# Patient Record
Sex: MALE | Race: WHITE
[De-identification: names, ages, dates, MRNs, and addresses within clinical notes are randomized per-mention and may not be internally consistent; named-entity substitution may affect disease eponyms.]

---

## 2018-02-28 ENCOUNTER — HOSPITAL ENCOUNTER (OUTPATIENT)
Dept: HOSPITAL 92 - ULT | Age: 73
Discharge: HOME | End: 2018-02-28
Attending: INTERNAL MEDICINE
Payer: MEDICARE

## 2018-02-28 DIAGNOSIS — N18.6: ICD-10-CM

## 2018-02-28 DIAGNOSIS — Z01.818: Primary | ICD-10-CM

## 2018-02-28 PROCEDURE — 93970 EXTREMITY STUDY: CPT

## 2018-02-28 PROCEDURE — G0365 VESSEL MAPPING HEMO ACCESS: HCPCS

## 2018-02-28 NOTE — ULT
BILATERAL UPPER EXTREMITY VENOUS MAPPIN18

 

HISTORY: 

End-stage renal disease. Evaluate for placement of arteriovenous dialysis fistula. 

 

FINDINGS:  

There is normal flow seen within the bilateral internal jugular and subclavian veins with normal lume
n compressibility involving each axillary vein. 

 

RIGHT UPPER EXTREMITY

 

BRACHIAL ARTERY:     5.6 mm  

RADIAL ARTERY:       3.1 mm

ULNAR ARTERY:        2.3 mm

 

CEPHALIC VEIN

Upper Arm:           1.3 mm

Mid Arm:             0.9 mm

Distal Arm:          1.7 mm

Antecubital Fossa:   1.5 mm

Proximal Forearm:    2 mm

Mid Forearm:         3.3 mm

Distal Forearm:      3.2 mm

 

BASILIC VEIN

Upper Arm:           8.5 mm

Mid Arm:             5.3 mm

Distal Arm:          5.1 mm

Antecubital Fossa:   4.8 mm

Proximal Forearm:    1.9 mm

Mid Forearm:         1.3 mm

Distal Forearm:      1.8 mm

 

 

LEFT UPPER EXTREMITY 

 

BRACHIAL ARTERY:    5.2 mm

RADIAL ARTERY:      3.2 mm

ULNAR ARTERY:       3 mm

 

CEPHALIC VEIN

Upper Arm:          1.7 mm

Mid Arm:            1.3 mm

Distal Arm:         1.2 mm

Antecubital Fossa:  1.9 mm

Proximal Forearm:   2.7 mm

Mid Forearm:        2.7 mm

Distal Forearm:     3 mm

 

BASILIC VEIN

Upper Arm:          5.2 mm

Mid Arm:            4.8 mm

Distal Arm:         4.3 mm

Antecubital Fossa:  2.9 mm

Proximal Forearm:   8.1 mm

Mid Forearm:        1.4 mm

Distal Forearm:     1.9 mm

 

IMPRESSION:

The venous diameters of the bilateral upper extremity basilic and cephalic veins are as described abo
nadege.

 

POS: [facility]

## 2018-04-02 ENCOUNTER — HOSPITAL ENCOUNTER (OUTPATIENT)
Dept: HOSPITAL 92 - LABBT | Age: 73
Discharge: HOME | End: 2018-04-02
Attending: SURGERY
Payer: MEDICARE

## 2018-04-02 DIAGNOSIS — I12.0: ICD-10-CM

## 2018-04-02 DIAGNOSIS — Z01.818: Primary | ICD-10-CM

## 2018-04-02 DIAGNOSIS — N18.6: ICD-10-CM

## 2018-04-02 LAB
ANION GAP SERPL CALC-SCNC: 18 MMOL/L (ref 10–20)
BASOPHILS # BLD AUTO: 0.1 THOU/UL (ref 0–0.2)
BASOPHILS NFR BLD AUTO: 1.1 % (ref 0–1)
BUN SERPL-MCNC: 73 MG/DL (ref 8.4–25.7)
CALCIUM SERPL-MCNC: 9.4 MG/DL (ref 7.8–10.44)
CHLORIDE SERPL-SCNC: 100 MMOL/L (ref 98–107)
CO2 SERPL-SCNC: 22 MMOL/L (ref 23–31)
CREAT CL PREDICTED SERPL C-G-VRATE: 0 ML/MIN (ref 70–130)
EOSINOPHIL # BLD AUTO: 0.7 THOU/UL (ref 0–0.7)
EOSINOPHIL NFR BLD AUTO: 7.5 % (ref 0–10)
GLUCOSE SERPL-MCNC: 107 MG/DL (ref 83–110)
HGB BLD-MCNC: 11.6 G/DL (ref 14–18)
LYMPHOCYTES # BLD: 1.5 THOU/UL (ref 1.2–3.4)
LYMPHOCYTES NFR BLD AUTO: 17.1 % (ref 21–51)
MCH RBC QN AUTO: 29.8 PG (ref 27–31)
MCV RBC AUTO: 91.3 FL (ref 80–94)
MONOCYTES # BLD AUTO: 0.8 THOU/UL (ref 0.11–0.59)
MONOCYTES NFR BLD AUTO: 9.2 % (ref 0–10)
NEUTROPHILS # BLD AUTO: 5.9 THOU/UL (ref 1.4–6.5)
NEUTROPHILS NFR BLD AUTO: 65.1 % (ref 42–75)
PLATELET # BLD AUTO: 239 THOU/UL (ref 130–400)
POTASSIUM SERPL-SCNC: 4.9 MMOL/L (ref 3.5–5.1)
RBC # BLD AUTO: 3.89 MILL/UL (ref 4.7–6.1)
SODIUM SERPL-SCNC: 135 MMOL/L (ref 136–145)
WBC # BLD AUTO: 9 THOU/UL (ref 4.8–10.8)

## 2018-04-02 PROCEDURE — 93010 ELECTROCARDIOGRAM REPORT: CPT

## 2018-04-02 PROCEDURE — 80048 BASIC METABOLIC PNL TOTAL CA: CPT

## 2018-04-02 PROCEDURE — 93005 ELECTROCARDIOGRAM TRACING: CPT

## 2018-04-02 PROCEDURE — 85025 COMPLETE CBC W/AUTO DIFF WBC: CPT

## 2018-04-02 NOTE — HP
HISTORY OF PRESENT ILLNESS:  Mr. Lobo Self is a 72-year-old male patient, retired contractor, 
lives in [place], [place].  Dialyzes, [facility] [place] Tuesday, Thursday, and Saturday, followed by Dr. Se de los santos.  Patient had a dialysis catheter placed at [facility] 01/03/2018.  He has been di
alyzing Tuesday, Thursday, and Saturday at [facility].  He presents for more definitive dialysis 
access.  Ultrasound vein mapping in both arms at [facility] on 02/28/2018 reveals cephalic v
ein, right, 1.3, 0.9, 1.7 mm.  At the antecubital fossa, 1.5 mm, distal forearm 2 mm, 3.3 and 3.2 mm,
  basilic vein upper arm 8.5, 5.3, 5.1, 4.8 mm.  Left arm cephalic vein 1.7, 1.3, 1.2, 1.9, 2.7, 2.7,
 3 mm, basilic vein 5.2, 4.8, 4.3 mm, 2.9 mm antecubital fossa, 8.1, 1.4, 1.9 mm distal forearm.  Xi
n is for a right arm fistula, possible Umair at the wrist or possible more proximal fistula, possibl
y, he may need a basilic vein transposition fistula has been discussed.  Patient's home health has be
en visited by the peritoneal dialysis nurse.  He has PAD.  He does not consider that, but is a future
 consideration.

 

PAST MEDICAL HISTORY:  BPH, coronary artery disease, status post myocardial infarction 2017 with 2 co
ronary stents placed.  Right kidney disease, stroke in the past 20 years ago, living with a left mirna
paresis.  He still has pain from this, although moderate, has recovered, diabetes mellitus, insulin-d
ependent type 2, hypertension, hyperlipidemia, history of renal cell carcinoma.

 

ALLERGIES:  None.

 

PAST SURGICAL HISTORY:  Left nephrectomy for renal cell carcinoma, coronary stents placed.  He has ha
d a colonoscopy many years ago.

 

REVIEW OF SYSTEMS:  Otherwise, noncontributory.

 

PHYSICAL EXAMINATION:

VITAL SIGNS:  177 pounds, 110/61, 76, 99.3 degrees.

HEENT:  Unremarkable.

LUNGS:  Clear to auscultation.

CARDIAC:  Regular rate and rhythm without murmur or gallop.

ABDOMEN:  Soft, nontender, no masses.  Supraumbilical scar for nephrectomy extraction point.

EXTREMITIES:  Unremarkable.  Palpable radial pulses noted to possible vein seen.

 

ASSESSMENT AND PLAN:  End-stage renal disease - hemodialysis catheter dependent since 01/03/2018 when
 it was placed at [facility].  He dialyzes at [facility] in [place], Tuesday, Thursd
ay, Saturday.  He lives in [place].  Plan is to place a right arm primary fistula, Umair versus more 
proximal.  He understands the possibility, he may need a staged basilic vein transposition versus pro
sthetic graft.  He understands risks of infection, bleeding, reoperation, malfunction of the fistula 
and consents.

## 2018-04-06 ENCOUNTER — HOSPITAL ENCOUNTER (OUTPATIENT)
Dept: HOSPITAL 92 - SDC | Age: 73
Discharge: HOME | End: 2018-04-06
Attending: SURGERY
Payer: MEDICARE

## 2018-04-06 VITALS — BODY MASS INDEX: 26.3 KG/M2

## 2018-04-06 DIAGNOSIS — I25.10: ICD-10-CM

## 2018-04-06 DIAGNOSIS — Z99.2: ICD-10-CM

## 2018-04-06 DIAGNOSIS — I25.2: ICD-10-CM

## 2018-04-06 DIAGNOSIS — N40.0: ICD-10-CM

## 2018-04-06 DIAGNOSIS — Z85.528: ICD-10-CM

## 2018-04-06 DIAGNOSIS — Z98.890: ICD-10-CM

## 2018-04-06 DIAGNOSIS — I12.0: Primary | ICD-10-CM

## 2018-04-06 DIAGNOSIS — E78.5: ICD-10-CM

## 2018-04-06 DIAGNOSIS — N18.6: ICD-10-CM

## 2018-04-06 DIAGNOSIS — E11.22: ICD-10-CM

## 2018-04-06 DIAGNOSIS — Z95.5: ICD-10-CM

## 2018-04-06 DIAGNOSIS — Z90.5: ICD-10-CM

## 2018-04-06 PROCEDURE — 96374 THER/PROPH/DIAG INJ IV PUSH: CPT

## 2018-04-06 PROCEDURE — 031B09F BYPASS RIGHT RADIAL ARTERY TO LOWER ARM VEIN WITH AUTOLOGOUS VENOUS TISSUE, OPEN APPROACH: ICD-10-PCS | Performed by: SURGERY

## 2018-04-06 NOTE — OP
DATE OF PROCEDURE:  04/06/2018

 

PREOPERATIVE DIAGNOSES:  End-stage renal disease, poor veins by ultrasound vein mapping left arm, sup
erior right arm suggesting basilic vein possibility fistula.

 

POSTOPERATIVE DIAGNOSES:  End-stage renal disease, poor veins by ultrasound vein mapping left arm, matias
perior right arm suggesting basilic vein possibility fistula.

 

PROCEDURES PERFORMED:  Right arm primary AV fistula, perforating branch antecubital vein to proximal 
radial artery, which was of the excellent size.  Outflow basilic vein only without communication to t
he cephalic vein.  Retrograde antecubital vein was large and patency maintained.  Calibration to a 4 
mm dilator, outflow basilic vein.  Exploration of right wrist noting cephalic vein at the wrist to be
 too small.

 

ANESTHESIA:  Regional anesthesia.

 

ESTIMATED BLOOD LOSS:  27 mL.

 

BLOOD TRANSFUSED:  None.

 

PROCEDURE IN DETAIL:  The patient was taken to the operating room under regional anesthesia and intra
venous sedation, right upper extremity was prepped with ChloraPrep, draped in routine fashion.  An in
cision was made in the proximal volar forearm longitudinally below the antecubital fossa, carried bret
n through the skin and subcutaneous tissue and a very large antecubital vein appreciated.  It was ender
reciated that there was no communication of the cephalic vein.  Cephalic vein was searched for latera
lly and could not be found.  Basilic vein went to the medial arm as expected.  A perforating branch a
ntecubital vein dissected free and directed towards a large radial artery.  Radial artery was dissect
ed free and surrounded with Silastic loop.  The patient was given 6000 units of heparin intravenously
.  Considering the large antecubital vein, incision was made in the right wrist carried down through 
the skin and subcutaneous tissue.  The cephalic vein at the wrist was inadequate and this wound was c
losed by approximating subcutaneous tissues with 3-0 Monocryl, skin with subdermal 4-0 Monocryl.  Att
ention then was turned to the proximal forearm.  A perforating branch and antecubital vein dissected 
free.  Branches divided between 4-0 silk ties and clips and spatulated over a branch point and interr
ogated with coronary dilators, passing coronary dilators from a 2 mm to a 4 mm coronary dilator out t
he basilic vein outflow.  There was a large antecubital vein, retrograde preserved.  Proximal radial 
artery was clamped proximally and distally and longitudinal arteriotomy made sharply and elongated wi
th the Rosales scissors for a 2 cm anastomosis and end perforating branch of the antecubital vein to th
e side proximal radial artery anastomosis with continuous suture of 6-0 Prolene, releasing vascular c
lamps, gaining hemostasis with 6-0 Prolene and 4-0 silk ties, noting good flow Doppler signal at the 
basilic vein.  Good hemostasis noted.  The patient was given 25 mg of protamine intravenously by Gerson dorantes.  Subcutaneous tissues approximated with 3-0 Monocryl, skin with subdermal 4-0 Monocryl and De
rmaGlue applied.

## 2018-05-03 NOTE — HP
HISTORY OF PRESENT ILLNESS:  Lobo Self is a 72-year-old male patient who dialyzes at [facility] on Tuesday, Thursday and Saturday.  He is followed by Dr. Thomas.  He underwent a right arm dialy
sis fistula 04/06/2018, and he was noted at that time during the operation that he would need a basil
ic vein transposition fistula.  He was seen today and his fistula is working well, has good thrill an
d bruit.  His wound has a small hematoma.  Plan is for basilic vein transposition fistula in the next
 two weeks as an outpatient under regional anesthesia.  He understands the risks and benefits of the 
procedure and consents.  The patient is a retired contractor, lives in [place], [place] and is followed 
by Dr. Severson, Nephrology.  He initially had a dialysis catheter placed at [facility] 0
1/03/2018.  He dialyzes Tuesday, Thursday and Saturday at [facility]

 

PAST MEDICAL HISTORY:  BPH, coronary artery disease, status post myocardial infarction in 2017 with 2
 coronary artery stents, right kidney with chronic kidney disease, stroke in the past 20 years ago wi
th residual left hemiparesis.  He still has pain from this, although moderate and has mostly recovere
d and is mobile independently.  Diabetes mellitus, insulin-dependent type 2, hypertension, hyperlipid
emia, history of renal cell carcinoma and supposedly has a metastatic focus in his lung that they hav
e been following.  He is up to date on his colonoscopies.

 

PAST SURGICAL HISTORY:  Left nephrectomy for renal cell carcinoma, coronary stents placed, colonoscop
y many years ago.

 

REVIEW OF SYSTEMS:  Noncontributory.

 

PHYSICAL EXAMINATION:

VITAL SIGNS:  123/62, 85, 99.7 degrees.

HEENT:  Unremarkable.

LUNGS:  Clear to auscultation.

CARDIAC:  Regular rate and rhythm without murmur or gallop.

ABDOMEN:  Soft, nontender.

EXTREMITIES:  He has a good functioning right hand.  He has well-healed surgical wound in proximal vo
lar forearm below the antecubital fossa.  He has a small hematoma.  There is no infection.  He has a 
good thrill and bruit.  He has outflow of the fistula.

 

ASSESSMENT AND PLAN:  We will plan basilic vein transposition fistula, right arm to allow functioning
 fistula.  Risk of infection, bleeding, reoperation, nerve injury, surgery explained and he consents.
  We will plan this as an outpatient under regional anesthesia.  IV access blood draws through his di
alysis catheter.

## 2018-05-09 ENCOUNTER — HOSPITAL ENCOUNTER (OUTPATIENT)
Dept: HOSPITAL 92 - SDC | Age: 73
Discharge: HOME | End: 2018-05-09
Attending: SURGERY
Payer: MEDICARE

## 2018-05-09 VITALS — BODY MASS INDEX: 25.8 KG/M2

## 2018-05-09 DIAGNOSIS — I25.2: ICD-10-CM

## 2018-05-09 DIAGNOSIS — I69.354: ICD-10-CM

## 2018-05-09 DIAGNOSIS — E11.22: ICD-10-CM

## 2018-05-09 DIAGNOSIS — Z79.82: ICD-10-CM

## 2018-05-09 DIAGNOSIS — Z79.899: ICD-10-CM

## 2018-05-09 DIAGNOSIS — Z85.528: ICD-10-CM

## 2018-05-09 DIAGNOSIS — N40.0: ICD-10-CM

## 2018-05-09 DIAGNOSIS — I25.10: ICD-10-CM

## 2018-05-09 DIAGNOSIS — Z79.02: ICD-10-CM

## 2018-05-09 DIAGNOSIS — Z95.5: ICD-10-CM

## 2018-05-09 DIAGNOSIS — Z79.4: ICD-10-CM

## 2018-05-09 DIAGNOSIS — I12.0: Primary | ICD-10-CM

## 2018-05-09 DIAGNOSIS — Z99.2: ICD-10-CM

## 2018-05-09 DIAGNOSIS — N18.6: ICD-10-CM

## 2018-05-09 LAB
ANION GAP SERPL CALC-SCNC: 13 MMOL/L (ref 10–20)
BASOPHILS # BLD AUTO: 0.1 THOU/UL (ref 0–0.2)
BASOPHILS NFR BLD AUTO: 0.7 % (ref 0–1)
BUN SERPL-MCNC: 33 MG/DL (ref 8.4–25.7)
CALCIUM SERPL-MCNC: 9.1 MG/DL (ref 7.8–10.44)
CHLORIDE SERPL-SCNC: 99 MMOL/L (ref 98–107)
CO2 SERPL-SCNC: 26 MMOL/L (ref 23–31)
CREAT CL PREDICTED SERPL C-G-VRATE: 17 ML/MIN (ref 70–130)
EOSINOPHIL # BLD AUTO: 0.6 THOU/UL (ref 0–0.7)
EOSINOPHIL NFR BLD AUTO: 5.6 % (ref 0–10)
GLUCOSE SERPL-MCNC: 122 MG/DL (ref 83–110)
HGB BLD-MCNC: 9.5 G/DL (ref 14–18)
LYMPHOCYTES # BLD: 1.1 THOU/UL (ref 1.2–3.4)
LYMPHOCYTES NFR BLD AUTO: 10.6 % (ref 21–51)
MCH RBC QN AUTO: 30.1 PG (ref 27–31)
MCV RBC AUTO: 86.4 FL (ref 80–94)
MONOCYTES # BLD AUTO: 0.9 THOU/UL (ref 0.11–0.59)
MONOCYTES NFR BLD AUTO: 8.2 % (ref 0–10)
NEUTROPHILS # BLD AUTO: 8 THOU/UL (ref 1.4–6.5)
NEUTROPHILS NFR BLD AUTO: 74.8 % (ref 42–75)
PLATELET # BLD AUTO: 257 THOU/UL (ref 130–400)
POTASSIUM SERPL-SCNC: 3.9 MMOL/L (ref 3.5–5.1)
RBC # BLD AUTO: 3.14 MILL/UL (ref 4.7–6.1)
SODIUM SERPL-SCNC: 134 MMOL/L (ref 136–145)
WBC # BLD AUTO: 10.7 THOU/UL (ref 4.8–10.8)

## 2018-05-09 PROCEDURE — 80048 BASIC METABOLIC PNL TOTAL CA: CPT

## 2018-05-09 PROCEDURE — 85025 COMPLETE CBC W/AUTO DIFF WBC: CPT

## 2018-05-09 PROCEDURE — 05SB0ZZ REPOSITION RIGHT BASILIC VEIN, OPEN APPROACH: ICD-10-PCS | Performed by: SURGERY

## 2018-05-09 NOTE — OP
DATE OF PROCEDURE:  05/09/2018

 

PREOPERATIVE DIAGNOSIS:  End-stage renal disease and need a basilic vein transposition fistula, right
 arm.

 

POSTOPERATIVE DIAGNOSIS:  End-stage renal disease and need a basilic vein transposition fistula, righ
t arm.

 

PROCEDURE:  Basilic vein transposition fistula, right arm.

 

SURGEON:  Sean Heard M.D.

 

ANESTHESIA:  Regional TIVA, local 0.5% Marcaine with epinephrine, 30 mL, mixed with 2% Xylocaine 10 m
L volume mixture used.

 

ESTIMATED BLOOD LOSS:  Less than 25 mL

 

BLOOD TRANSFUSED:  None.

 

Note:  Excellent vein.

 

PROCEDURE:  The patient was taken to the operating room where under regional anesthesia and intraveno
us sedation, right upper extremity was prepared with ChloraPrep, draped in routine fashion.  Incision
 was made from the proximal volar forearm through the old scar and carried up through the medial aspe
ct of the upper arm to the axilla, carried down to the  subcutaneous tissue to the deep fascia mobili
zing the basilic vein, dividing branch with 4-0 silk ties and clips.  Vein marked.  Patient was given
 6000 units heparin intravenously after new tunnel made anteriorly and subcutaneous tissue using the 
[place] tunneler 12 mm head.  A 12-mm head was exchanged for a 6-mm head.  After adequate heparin 
circulation, the basilic vein just beyond the perforating branch arterial inflow in the proximal fore
arm was divided placing the vascular clamps on the arterial inflow to the basilic vein.  It was divid
ed in spatulated fashion and connected to the tunneler and retunneled and brought out through the inc
ision and flushed with heparinized saline solution where it was appreciated to flush well without obs
truction without torsion.  End to end vein anastomosis created with continuous suture of 6-0 Prolene,
 releasing vascular clamps, noting good flow in the fistula.  The patient was given 50 mg of protamin
e intravenously.  Good hemostasis noted and obtained with the cautery and clips and 4-0 silk ties.  S
urgiSeal placed in the vein harvest bed and subcutaneous tissues approximately 3-0 Monocryl, skin wit
h staples and Xeroform sterile dressings applied and Ace wrap applied.  Patient tolerated the procedu
re well.